# Patient Record
Sex: FEMALE | Race: WHITE | NOT HISPANIC OR LATINO | Employment: STUDENT | URBAN - METROPOLITAN AREA
[De-identification: names, ages, dates, MRNs, and addresses within clinical notes are randomized per-mention and may not be internally consistent; named-entity substitution may affect disease eponyms.]

---
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
## 2021-01-04 ENCOUNTER — TELEPHONE (OUTPATIENT)
Dept: OBGYN CLINIC | Facility: CLINIC | Age: 21
End: 2021-01-04

## 2021-01-04 DIAGNOSIS — Z30.41 ENCOUNTER FOR SURVEILLANCE OF CONTRACEPTIVE PILLS: Primary | ICD-10-CM

## 2021-01-04 DIAGNOSIS — Z30.011 ENCOUNTER FOR INITIAL PRESCRIPTION OF CONTRACEPTIVE PILLS: ICD-10-CM

## 2021-01-04 RX ORDER — NORGESTIMATE AND ETHINYL ESTRADIOL 7DAYSX3 LO
1 KIT ORAL DAILY
Qty: 84 TABLET | Refills: 2 | Status: SHIPPED | OUTPATIENT
Start: 2021-01-04 | End: 2021-01-07 | Stop reason: SDUPTHER

## 2021-01-04 NOTE — TELEPHONE ENCOUNTER
Pt states Jayna Jackman is substituting Tri Lo Sarah for norgestimate/ethinyl estradiol triphasic (on back order)    Please sign off on presc to [facility]

## 2021-01-06 ENCOUNTER — TELEPHONE (OUTPATIENT)
Dept: OBGYN CLINIC | Facility: CLINIC | Age: 21
End: 2021-01-06

## 2021-01-07 DIAGNOSIS — Z30.41 ENCOUNTER FOR SURVEILLANCE OF CONTRACEPTIVE PILLS: ICD-10-CM

## 2021-01-07 RX ORDER — NORGESTIMATE AND ETHINYL ESTRADIOL 7DAYSX3 LO
1 KIT ORAL DAILY
Qty: 84 TABLET | Refills: 2 | Status: SHIPPED | OUTPATIENT
Start: 2021-01-07 | End: 2021-08-30 | Stop reason: SDUPTHER

## 2021-01-07 NOTE — TELEPHONE ENCOUNTER
[facility] pharm needs ocp presc to say ok to substitue with Tri Lo Swathi - please sign off on presc to EDITH Gautam

## 2021-04-02 ENCOUNTER — TELEPHONE (OUTPATIENT)
Dept: OBGYN CLINIC | Facility: CLINIC | Age: 21
End: 2021-04-02

## 2021-04-02 NOTE — TELEPHONE ENCOUNTER
Pt calling to double check rx  rx is otc lo, but rx'd by bob robb  Lm for pt that we have never seen her and she possibly called the wrong office

## 2021-06-14 ENCOUNTER — TELEPHONE (OUTPATIENT)
Dept: OBGYN CLINIC | Facility: CLINIC | Age: 21
End: 2021-06-14

## 2021-06-14 NOTE — TELEPHONE ENCOUNTER
Patient is calling with questions regarding spotting and if there could be correlation to the covid vaccine  She did miss pills this cycle

## 2021-06-14 NOTE — TELEPHONE ENCOUNTER
pt had delayed ocp x 1 in 2nd wk active pill on 6/1/2021 & made up 18 hrs later  Also had 1st COVID vaccine on 6/3/2021  Did not have intercourse at time of delayed pill per pt   recom back up birth control for rest of this cycle if sexually active & consistently if misses or delays pills  Will recall if persistent BTB

## 2021-08-30 DIAGNOSIS — Z30.41 ENCOUNTER FOR SURVEILLANCE OF CONTRACEPTIVE PILLS: ICD-10-CM

## 2021-08-30 RX ORDER — NORGESTIMATE AND ETHINYL ESTRADIOL 7DAYSX3 LO
1 KIT ORAL DAILY
Qty: 84 TABLET | Refills: 0 | Status: SHIPPED | OUTPATIENT
Start: 2021-08-30 | End: 2021-11-16 | Stop reason: SDUPTHER

## 2021-08-30 NOTE — TELEPHONE ENCOUNTER
Pt req rf Ortho TriCyclen Lo - seen 6/2020 as new pt & had ocp f/u appt 8/2021 - advised pt needs to schedule yearly appt - sched for 11/16/2021    Please sign off on presc to hold until appt to [facility])

## 2021-11-16 ENCOUNTER — ANNUAL EXAM (OUTPATIENT)
Dept: OBGYN CLINIC | Facility: CLINIC | Age: 21
End: 2021-11-16
Payer: COMMERCIAL

## 2021-11-16 VITALS
SYSTOLIC BLOOD PRESSURE: 108 MMHG | WEIGHT: 110 LBS | HEIGHT: 58 IN | DIASTOLIC BLOOD PRESSURE: 66 MMHG | BODY MASS INDEX: 23.09 KG/M2

## 2021-11-16 DIAGNOSIS — Z30.41 ENCOUNTER FOR SURVEILLANCE OF CONTRACEPTIVE PILLS: ICD-10-CM

## 2021-11-16 DIAGNOSIS — Z11.3 SCREENING EXAMINATION FOR STD (SEXUALLY TRANSMITTED DISEASE): ICD-10-CM

## 2021-11-16 DIAGNOSIS — Z01.419 ENCOUNTER FOR ANNUAL ROUTINE GYNECOLOGICAL EXAMINATION: Primary | ICD-10-CM

## 2021-11-16 PROCEDURE — 99395 PREV VISIT EST AGE 18-39: CPT | Performed by: OBSTETRICS & GYNECOLOGY

## 2021-11-16 RX ORDER — NORGESTIMATE AND ETHINYL ESTRADIOL 7DAYSX3 LO
1 KIT ORAL DAILY
Qty: 84 TABLET | Refills: 3 | Status: SHIPPED | OUTPATIENT
Start: 2021-11-16

## 2021-11-18 LAB
C TRACH RRNA SPEC QL NAA+PROBE: NOT DETECTED
N GONORRHOEA RRNA SPEC QL NAA+PROBE: NOT DETECTED
T VAGINALIS RRNA SPEC QL NAA+PROBE: NOT DETECTED

## 2021-11-23 LAB
CLINICAL INFO: NORMAL
CYTO CVX: NORMAL
CYTOLOGY CMNT CVX/VAG CYTO-IMP: NORMAL
DATE PREVIOUS BX: NORMAL
LMP START DATE: NORMAL
SL AMB PREV. PAP:: NORMAL
SPECIMEN SOURCE CVX/VAG CYTO: NORMAL

## 2022-11-28 ENCOUNTER — ANNUAL EXAM (OUTPATIENT)
Dept: OBGYN CLINIC | Facility: CLINIC | Age: 22
End: 2022-11-28

## 2022-11-28 VITALS
WEIGHT: 121 LBS | HEIGHT: 58 IN | DIASTOLIC BLOOD PRESSURE: 68 MMHG | SYSTOLIC BLOOD PRESSURE: 104 MMHG | BODY MASS INDEX: 25.4 KG/M2

## 2022-11-28 DIAGNOSIS — Z01.419 ENCOUNTER FOR ANNUAL ROUTINE GYNECOLOGICAL EXAMINATION: Primary | ICD-10-CM

## 2022-11-28 DIAGNOSIS — Z30.41 ENCOUNTER FOR SURVEILLANCE OF CONTRACEPTIVE PILLS: ICD-10-CM

## 2022-11-28 DIAGNOSIS — Z11.3 SCREENING EXAMINATION FOR STD (SEXUALLY TRANSMITTED DISEASE): ICD-10-CM

## 2022-11-28 RX ORDER — NORGESTIMATE AND ETHINYL ESTRADIOL 7DAYSX3 LO
1 KIT ORAL DAILY
Qty: 84 TABLET | Refills: 4 | Status: SHIPPED | OUTPATIENT
Start: 2022-11-28

## 2022-11-28 NOTE — PROGRESS NOTES
Assessment/Plan:  Pap smear done as well as annual   Cultures obtained for GC, chlamydia, Trichomonas  Encouraged self-breast examination as well as calcium supplementation  Continue Ortho-Tri-Cyclen Lo x1 year  Reviewed safe sex practices  Return to office in 1 year or p r n  No problem-specific Assessment & Plan notes found for this encounter  Diagnoses and all orders for this visit:    Encounter for annual routine gynecological examination  -     Liquid-based pap, screening    Encounter for surveillance of contraceptive pills  -     norgestimate-ethinyl estradiol (Tri-Lo-Sarah) 0 18/0 215/0 25 MG-25 MCG per tablet; Take 1 tablet by mouth daily    Screening examination for STD (sexually transmitted disease)  -     Ct, Ng, Trich vag by TANVI          Subjective:      Patient ID: Meet Hewitt is a 25 y o  female  HPI     This is a very pleasant 20-year-old female G0 presents for gyn exam   Her menstrual cycles are regular every 4 weeks lasting 4 days with no breakthrough bleeding  She denies any changes in bowel or bladder function  She has had 2 sexual partners in the course of the year  She uses condoms intermittently  She did receive the Gardasil vaccine age 14  She is in her senior year of college  The following portions of the patient's history were reviewed and updated as appropriate: allergies, current medications, past family history, past medical history, past social history, past surgical history and problem list     Review of Systems   Constitutional: Negative for fatigue, fever and unexpected weight change  Respiratory: Negative for cough, chest tightness, shortness of breath and wheezing  Cardiovascular: Negative  Negative for chest pain and palpitations  Gastrointestinal: Negative  Negative for abdominal distention, abdominal pain, blood in stool, constipation, diarrhea, nausea and vomiting  Genitourinary: Negative    Negative for difficulty urinating, dyspareunia, dysuria, flank pain, frequency, genital sores, hematuria, pelvic pain, urgency, vaginal bleeding, vaginal discharge and vaginal pain  Skin: Negative for rash  Objective:      /68   Ht 4' 10" (1 473 m)   Wt 54 9 kg (121 lb)   LMP 11/17/2022   BMI 25 29 kg/m²          Physical Exam  Constitutional:       Appearance: Normal appearance  She is well-developed and well-nourished  Cardiovascular:      Rate and Rhythm: Normal rate and regular rhythm  Pulmonary:      Effort: Pulmonary effort is normal       Breath sounds: Normal breath sounds  Chest:   Breasts:     Right: No inverted nipple, mass, nipple discharge, skin change or tenderness  Left: No inverted nipple, mass, nipple discharge, skin change or tenderness  Abdominal:      General: Bowel sounds are normal  There is no distension  Palpations: Abdomen is soft  Tenderness: There is no abdominal tenderness  There is no guarding or rebound  Genitourinary:     Labia:         Right: No rash, tenderness or lesion  Left: No rash, tenderness or lesion  Vagina: Normal  No signs of injury  No vaginal discharge or tenderness  Cervix: No cervical motion tenderness, discharge, friability, lesion, erythema or cervical bleeding  Uterus: Normal  Not enlarged and not tender  Adnexa:         Right: No mass, tenderness or fullness  Left: No mass, tenderness or fullness  Neurological:      Mental Status: She is alert and oriented to person, place, and time     Psychiatric:         Behavior: Behavior normal

## 2022-12-05 ENCOUNTER — TELEPHONE (OUTPATIENT)
Dept: OBGYN CLINIC | Facility: CLINIC | Age: 22
End: 2022-12-05

## 2022-12-05 LAB
LAB AP GYN PRIMARY INTERPRETATION: ABNORMAL
LAB AP LMP: ABNORMAL
Lab: ABNORMAL
PATH INTERP SPEC-IMP: ABNORMAL

## 2022-12-05 NOTE — TELEPHONE ENCOUNTER
----- Message from Lolly Britt DO sent at 12/5/2022 10:02 AM EST -----  Please call the patient regarding her abnormal result  Informed Pap smear mildly abnormal, recommend colposcopy  She will need NSAIDs prior to office visit, hydrate

## 2022-12-05 NOTE — TELEPHONE ENCOUNTER
Pt informed of pap results & recom f/u with colposcopy - procedure explained with pre-instr given  Info sheet mailed to pt  appt scheduled

## 2023-01-11 ENCOUNTER — PROCEDURE VISIT (OUTPATIENT)
Dept: OBGYN CLINIC | Facility: CLINIC | Age: 23
End: 2023-01-11

## 2023-01-11 VITALS
BODY MASS INDEX: 22.25 KG/M2 | DIASTOLIC BLOOD PRESSURE: 70 MMHG | SYSTOLIC BLOOD PRESSURE: 106 MMHG | HEIGHT: 58 IN | WEIGHT: 106 LBS

## 2023-01-11 DIAGNOSIS — R87.612 LOW GRADE SQUAMOUS INTRAEPITHELIAL LESION ON CYTOLOGIC SMEAR OF CERVIX (LGSIL): Primary | ICD-10-CM

## 2023-01-11 NOTE — PROGRESS NOTES
Assessment/Plan:  Colposcopy done with ECC and biopsy at 7  Well-tolerated  Patient received Gardasil vaccine age 14  She will call for results in 2 weeks  Provided stable, return to office 11/2023 for annual visit and repeat Pap smear  All questions answered  No problem-specific Assessment & Plan notes found for this encounter  Diagnoses and all orders for this visit:    Low grade squamous intraepithelial lesion on cytologic smear of cervix (LGSIL)    Other orders  -     Colposcopy          Subjective:      Patient ID: Maggi Foote is a 25 y o  female  HPI     This is a pleasant 25-year-old female [de-identified] presents for colposcopy  She had her first abnormal Pap smear revealing low-grade CATHY  LGSIL    Gardasil age 14        The following portions of the patient's history were reviewed and updated as appropriate: allergies, current medications, past family history, past medical history, past social history, past surgical history and problem list     Review of Systems   Constitutional: Negative for fatigue, fever and unexpected weight change  Respiratory: Negative for cough, chest tightness, shortness of breath and wheezing  Cardiovascular: Negative  Negative for chest pain and palpitations  Gastrointestinal: Negative  Negative for abdominal distention, abdominal pain, blood in stool, constipation, diarrhea, nausea and vomiting  Genitourinary: Negative  Negative for difficulty urinating, dyspareunia, dysuria, flank pain, frequency, genital sores, hematuria, pelvic pain, urgency, vaginal bleeding, vaginal discharge and vaginal pain  Skin: Negative for rash  Objective:      /70   Ht 4' 10" (1 473 m)   Wt 48 1 kg (106 lb)   LMP 12/10/2022   BMI 22 15 kg/m²          Physical Exam  Constitutional:       Appearance: Normal appearance  She is well-developed     Pulmonary:      Effort: Pulmonary effort is normal    Genitourinary:     Labia:         Right: No rash, tenderness or lesion  Left: No rash, tenderness or lesion  Vagina: Normal  No signs of injury  No vaginal discharge or tenderness  Cervix: No cervical motion tenderness, discharge, friability, lesion, erythema or cervical bleeding  Adnexa:         Right: No mass, tenderness or fullness  Left: No mass, tenderness or fullness  Neurological:      Mental Status: She is alert and oriented to person, place, and time  Psychiatric:         Behavior: Behavior normal             Colposcopy     Date/Time 1/11/2023 1:16 PM     Universal Protocol   Consent: Verbal consent obtained  Consent given by: patient  Patient understanding: patient states understanding of the procedure being performed  Patient identity confirmed: verbally with patient       Performed by  Clive Crawley DO     Authorized by Clive Crawley DO        Pre-procedure details     Premeds:  Ibuprofen    Prepped with: acetic acid       Indication    LSIL     Procedure Details   Procedure: Colposcopy w/ cervical biopsy and ECC      Under satisfactory analgesia the patient was prepped and draped in the dorsal lithotomy position: yes      [place] speculum was placed in the vagina: yes      Endocervix was curetted using a Kevorkian curette: yes      Cervical biopsy performed with a cervical biopsy punch: yes      Monsel's solution was applied: yes      Biopsy(s): yes      Location:  ECC, 7 o'clock    Specimen to pathology: yes       Post-procedure      Findings: White epithelium      Patient tolerance of procedure: Tolerated well, no immediate complications     Comments       Cervix was visualized cleansed with acetic acid  The squamocolumnar junction was visualized  Mild acetowhitening changes noted at 7:00  ECC was performed first followed by biopsy at 7  Hemostasis was maintained using Monsel solution  Patient tolerated procedure well

## 2023-01-20 ENCOUNTER — TELEPHONE (OUTPATIENT)
Dept: OBGYN CLINIC | Facility: CLINIC | Age: 23
End: 2023-01-20

## 2023-01-20 NOTE — TELEPHONE ENCOUNTER
Pt viewing results in [address] [place] [address],[address] [facility] pathology from 1/11/2023  Informed will f/u with KA on 1//23/2022 as results not reviewed by provider yet

## 2023-01-23 NOTE — TELEPHONE ENCOUNTER
Pt unable to schedule appt for 1/24/2022 due to school class shedule  Scheduled appt for 1/25/2022 to discuss f/u colposcopy results

## 2023-01-24 NOTE — TELEPHONE ENCOUNTER
Pt had sched appt for 1/25/2022 f/u colposcopy results & is now requesting if can discuss via phone or virtual as she is away @ school in [place]

## 2023-01-25 ENCOUNTER — TELEMEDICINE (OUTPATIENT)
Dept: OBGYN CLINIC | Facility: CLINIC | Age: 23
End: 2023-01-25

## 2023-01-25 DIAGNOSIS — N87.9 CERVICAL DYSPLASIA: Primary | ICD-10-CM

## 2023-01-25 NOTE — PROGRESS NOTES
Virtual Regular Visit    Verification of patient location:    Patient is located in the following state in which I hold an active license [place]      Assessment/Plan:    Reviewed pathology consistent with RONEN-2/3 negative ECC  Implications of moderate to severe cervical dysplasia reviewed  Discussed treatment options including excisional procedure, LEEP  Risks and benefits reviewed  Patient is appropriately upset  She states that her paternal aunt had some type of GYN cancer resulting in adjuvant therapy  She will keep me updated with her family history  At this point she is in her senior year of college will be graduating 5/2023  I will have office staff contact her regarding scheduling surgical date  She will need preop visit  We discussed alternative plans including follow-up colposcopy 6 months  She will discuss this with her family  All questions answered at this time  Problem List Items Addressed This Visit    None           Reason for visit is   Chief Complaint   Patient presents with   • Virtual Regular Visit        Encounter provider Josue Field DO    Provider located at [address] [place] [address]  [phone]      Recent Visits  Date Type Provider Dept   01/20/23 Telephone Kary Henao RN [facility]   Showing recent visits within past 7 days and meeting all other requirements  Future Appointments  No visits were found meeting these conditions  Showing future appointments within next 150 days and meeting all other requirements       The patient was identified by name and date of birth  Frank Beard was informed that this is a telemedicine visit and that the visit is being conducted through the [employer]  She agrees to proceed     My office door was closed  No one else was in the room  She acknowledged consent and understanding of privacy and security of the video platform   The patient has agreed to participate and understands they can discontinue the visit at any time  Patient is aware this is a billable service  Vivian Sahni is a 25 y o  female G0 presents via video conference for follow-up colposcopy  She had her first Pap smear which had revealed low-grade CATHY  She underwent colposcopy which had revealed RONEN-2/3 with negative ECC  She received the Gardasil vaccine at age 14  She has had 2 sexual partners over the course of the year  She uses condoms intermittently  Her menstrual cycles are regular every 4 weeks lasting 4 days  HPI     Past Medical History:   Diagnosis Date   • Eruption, polymorphous, light     allergist       Past Surgical History:   Procedure Laterality Date   • FOREARM FRACTURE SURGERY Right 10/2018       Current Outpatient Medications   Medication Sig Dispense Refill   • multivitamin (THERAGRAN) TABS Take 1 tablet by mouth daily     • norgestimate-ethinyl estradiol (Tri-Lo-Sarah) 0 18/0 215/0 25 MG-25 MCG per tablet Take 1 tablet by mouth daily 84 tablet 4     No current facility-administered medications for this visit  Allergies   Allergen Reactions   • Amoxicillin Fever       Review of Systems   Constitutional: Negative for fatigue, fever and unexpected weight change  Respiratory: Negative for cough, chest tightness, shortness of breath and wheezing  Cardiovascular: Negative  Negative for chest pain and palpitations  Gastrointestinal: Negative  Negative for abdominal distention, abdominal pain, blood in stool, constipation, diarrhea, nausea and vomiting  Genitourinary: Negative  Negative for difficulty urinating, dyspareunia, dysuria, flank pain, frequency, genital sores, hematuria, pelvic pain, urgency, vaginal bleeding, vaginal discharge and vaginal pain  Skin: Negative for rash  Video Exam    There were no vitals filed for this visit  Physical Exam  Constitutional:       Appearance: Normal appearance     Pulmonary: Effort: Pulmonary effort is normal    Neurological:      Mental Status: She is alert and oriented to person, place, and time     Psychiatric:         Behavior: Behavior normal           I spent 25 minutes directly with the patient during this visit

## 2023-01-30 ENCOUNTER — TELEPHONE (OUTPATIENT)
Dept: OBGYN CLINIC | Facility: CLINIC | Age: 23
End: 2023-01-30

## 2023-01-30 ENCOUNTER — PREP FOR PROCEDURE (OUTPATIENT)
Dept: OBGYN CLINIC | Facility: CLINIC | Age: 23
End: 2023-01-30

## 2023-01-30 DIAGNOSIS — Z01.818 PREOPERATIVE TESTING: Primary | ICD-10-CM

## 2023-01-30 DIAGNOSIS — D06.9 CERVICAL INTRAEPITHELIAL NEOPLASIA GRADE III WITH SEVERE DYSPLASIA: ICD-10-CM

## 2023-01-30 DIAGNOSIS — R87.613 PAP SMEAR ABNORMALITY OF CERVIX WITH HGSIL: ICD-10-CM

## 2023-01-30 NOTE — TELEPHONE ENCOUNTER
Patient aware surgery scheduled 3-27-23 at [facility]  Pre-op apt with Dr Annie Berger scheduled and aware will need PAT labs

## 2023-03-16 NOTE — PRE-PROCEDURE INSTRUCTIONS
Pre-Surgery Instructions:   Medication Instructions   • multivitamin (THERAGRAN) TABS Hold this medication for 7 days before surgery     • norgestimate-ethinyl estradiol (Tri-Lo-Sarah) 0 18/0 215/0 25 MG-25 MCG per tablet Hold day of surgery      Medication instructions for day surgery reviewed  Please use only a sip of water to take your instructed medications  Avoid all over the counter vitamins, supplements and NSAIDS for one week prior to surgery per anesthesia guidelines  Tylenol is ok to take as needed  You will receive a call one business day prior to surgery with an arrival time and hospital directions  If your surgery is scheduled on a Monday, the hospital will be calling you on the Friday prior to your surgery  If you have not heard from anyone by 8pm, please call the hospital supervisor through the hospital  at [phone]  Sherman Agee [phone])  Do not eat or drink anything after midnight the night before your surgery, including candy, mints, lifesavers, or chewing gum  Do not drink alcohol 24hrs before your surgery  Try not to smoke at least 24hrs before your surgery  Follow the pre surgery showering instructions as listed in the [facility] Surgical Experience Booklet” or otherwise provided by your surgeon's office  Do not shave the surgical area 24 hours before surgery  Do not apply any lotions, creams, including makeup, cologne, deodorant, or perfumes after showering on the day of your surgery  No contact lenses, eye make-up, or artificial eyelashes  Remove nail polish, including gel polish, and any artificial, gel, or acrylic nails if possible  Remove all jewelry including rings and body piercing jewelry  Wear causal clothing that is easy to take on and off  Consider your type of surgery  Keep any valuables, jewelry, piercings at home  Please bring any specially ordered equipment (sling, braces) if indicated      Arrange for a responsible person to drive you to and from the hospital on the day of your surgery  Visitor Guidelines discussed  Call the surgeon's office with any new illnesses, exposures, or additional questions prior to surgery  Please reference your [facility] Surgical Experience Booklet” for additional information to prepare for your upcoming surgery

## 2023-03-20 ENCOUNTER — PREP FOR PROCEDURE (OUTPATIENT)
Dept: OBGYN CLINIC | Facility: CLINIC | Age: 23
End: 2023-03-20

## 2023-03-20 ENCOUNTER — OFFICE VISIT (OUTPATIENT)
Dept: OBGYN CLINIC | Facility: CLINIC | Age: 23
End: 2023-03-20

## 2023-03-20 ENCOUNTER — APPOINTMENT (OUTPATIENT)
Dept: LAB | Facility: CLINIC | Age: 23
End: 2023-03-20

## 2023-03-20 VITALS
WEIGHT: 107 LBS | DIASTOLIC BLOOD PRESSURE: 70 MMHG | BODY MASS INDEX: 22.46 KG/M2 | HEIGHT: 58 IN | SYSTOLIC BLOOD PRESSURE: 110 MMHG

## 2023-03-20 DIAGNOSIS — Z01.818 PREOP TESTING: ICD-10-CM

## 2023-03-20 DIAGNOSIS — Z01.818 PREOP TESTING: Primary | ICD-10-CM

## 2023-03-20 DIAGNOSIS — D06.9 CERVICAL INTRAEPITHELIAL NEOPLASIA GRADE III WITH SEVERE DYSPLASIA: Primary | ICD-10-CM

## 2023-03-20 LAB
ANION GAP SERPL CALCULATED.3IONS-SCNC: 2 MMOL/L (ref 4–13)
B-HCG SERPL-ACNC: <2 MIU/ML
BASOPHILS # BLD AUTO: 0.04 THOUSANDS/ÂΜL (ref 0–0.1)
BASOPHILS NFR BLD AUTO: 1 % (ref 0–1)
BUN SERPL-MCNC: 12 MG/DL (ref 5–25)
CALCIUM SERPL-MCNC: 8.8 MG/DL (ref 8.3–10.1)
CHLORIDE SERPL-SCNC: 110 MMOL/L (ref 96–108)
CO2 SERPL-SCNC: 27 MMOL/L (ref 21–32)
CREAT SERPL-MCNC: 0.71 MG/DL (ref 0.6–1.3)
EOSINOPHIL # BLD AUTO: 0.3 THOUSAND/ÂΜL (ref 0–0.61)
EOSINOPHIL NFR BLD AUTO: 5 % (ref 0–6)
ERYTHROCYTE [DISTWIDTH] IN BLOOD BY AUTOMATED COUNT: 13.3 % (ref 11.6–15.1)
GFR SERPL CREATININE-BSD FRML MDRD: 122 ML/MIN/1.73SQ M
GLUCOSE P FAST SERPL-MCNC: 89 MG/DL (ref 65–99)
HCT VFR BLD AUTO: 41.7 % (ref 34.8–46.1)
HGB BLD-MCNC: 13.4 G/DL (ref 11.5–15.4)
IMM GRANULOCYTES # BLD AUTO: 0.01 THOUSAND/UL (ref 0–0.2)
IMM GRANULOCYTES NFR BLD AUTO: 0 % (ref 0–2)
LYMPHOCYTES # BLD AUTO: 1.99 THOUSANDS/ÂΜL (ref 0.6–4.47)
LYMPHOCYTES NFR BLD AUTO: 36 % (ref 14–44)
MCH RBC QN AUTO: 28.6 PG (ref 26.8–34.3)
MCHC RBC AUTO-ENTMCNC: 32.1 G/DL (ref 31.4–37.4)
MCV RBC AUTO: 89 FL (ref 82–98)
MONOCYTES # BLD AUTO: 0.33 THOUSAND/ÂΜL (ref 0.17–1.22)
MONOCYTES NFR BLD AUTO: 6 % (ref 4–12)
NEUTROPHILS # BLD AUTO: 2.87 THOUSANDS/ÂΜL (ref 1.85–7.62)
NEUTS SEG NFR BLD AUTO: 52 % (ref 43–75)
NRBC BLD AUTO-RTO: 0 /100 WBCS
PLATELET # BLD AUTO: 237 THOUSANDS/UL (ref 149–390)
PMV BLD AUTO: 10.4 FL (ref 8.9–12.7)
POTASSIUM SERPL-SCNC: 4.4 MMOL/L (ref 3.5–5.3)
RBC # BLD AUTO: 4.69 MILLION/UL (ref 3.81–5.12)
SODIUM SERPL-SCNC: 139 MMOL/L (ref 135–147)
WBC # BLD AUTO: 5.54 THOUSAND/UL (ref 4.31–10.16)

## 2023-03-20 NOTE — PROGRESS NOTES
Assessment/Plan:  Reviewed pathology consistent with RONEN-2/3  Discussed the risks and benefits of examined anesthesia LEEP procedure including injury to adjacent structures, leaving residual disease requiring further surgery, cervical incompetence in future pregnancies  All questions obtained  Consent obtained today  Recommend preop labs including CBC, BMP, pregnancy test   She will schedule postop visit 2 to 3 weeks  All questions answered  No problem-specific Assessment & Plan notes found for this encounter  Diagnoses and all orders for this visit:    Cervical intraepithelial neoplasia grade III with severe dysplasia          Subjective:      Patient ID: Rafiq Kee is a 25 y o  female  HPI     This is a very pleasant 44-year-old female [de-identified] presents for discussion of cervical dysplasia  She had her first abnormal Pap smear revealing low-grade CATHY  She then underwent colposcopy revealing RONEN-2/3 with negative endocervical cells we have discussed treatment options  Including examined anesthesia, colposcopy  She did receive the Gardasil vaccine age 14  This is her first abnormal Pap smear  The following portions of the patient's history were reviewed and updated as appropriate: allergies, current medications, past family history, past medical history, past social history, past surgical history and problem list     Review of Systems   Constitutional: Negative for fatigue, fever and unexpected weight change  Respiratory: Negative for cough, chest tightness, shortness of breath and wheezing  Cardiovascular: Negative  Negative for chest pain and palpitations  Gastrointestinal: Negative  Negative for abdominal distention, abdominal pain, blood in stool, constipation, diarrhea, nausea and vomiting  Genitourinary: Negative    Negative for difficulty urinating, dyspareunia, dysuria, flank pain, frequency, genital sores, hematuria, pelvic pain, urgency, vaginal bleeding, vaginal discharge and vaginal pain  Skin: Negative for rash  Objective:      /70   Ht 4' 10" (1 473 m)   Wt 48 5 kg (107 lb)   LMP 02/20/2023   BMI 22 36 kg/m²          Physical Exam  Constitutional:       Appearance: Normal appearance  HENT:      Head: Normocephalic and atraumatic  Cardiovascular:      Rate and Rhythm: Normal rate and regular rhythm  Pulmonary:      Effort: Pulmonary effort is normal       Breath sounds: Normal breath sounds  Abdominal:      General: Bowel sounds are normal       Palpations: Abdomen is soft  Genitourinary:     Labia:         Right: No rash, tenderness or lesion  Left: No rash, tenderness or lesion  Vagina: No signs of injury  No vaginal discharge or tenderness  Cervix: No cervical motion tenderness, discharge, friability, lesion, erythema or cervical bleeding  Uterus: Not enlarged and not tender  Adnexa:         Right: No mass, tenderness or fullness  Left: No mass, tenderness or fullness  Skin:     General: Skin is dry  Neurological:      Mental Status: She is alert and oriented to person, place, and time  Psychiatric:         Behavior: Behavior normal        I have spent a total time of 30 minutes on 03/20/23 in caring for this patient including Instructions for management

## 2023-03-24 NOTE — H&P
H&P Exam - Gynecology   Sydnie Rosa 25 y o  female MRN: [id number]  Unit/Bed#:  Encounter: [id number]    Assessment/Plan     Assessment:  #1  RONEN 2/3    Plan:  Reviewed pathology consistent with RONEN-2/3  Discussed the risks and benefits of examined anesthesia LEEP procedure including injury to adjacent structures, leaving residual disease requiring further surgery, cervical incompetence in future pregnancies  All questions obtained  Consent obtained today  Recommend preop labs including CBC, BMP, pregnancy test   She will schedule postop visit 2 to 3 weeks  All questions answered  History of Present Illness     HPI:  Sydnie Rosa is a 25 y o  female [de-identified] presents for EUA, LEEP  [facility] She had her first abnormal Pap smear revealing low-grade CATHY  She then underwent colposcopy revealing RONEN-2/3 with negative endocervical cells we have discussed treatment options  Including examined anesthesia, colposcopy  11/22 Pap LGSIL      1/2023 Colpo:    A  Cervix, Endocervical, ECC:      - Benign fragments of endocervical glands     B  Cervix, 7'Oclock:      - High-grade squamous intraepithelial lesion (HSIL/CIN2-3), see note       - HSIL focally involves endocervical glands       She did receive the Gardasil vaccine age 14  This is her first abnormal Pap smear       Review of Systems   Constitutional: Negative for fatigue, fever and unexpected weight change  Respiratory: Negative for cough, chest tightness, shortness of breath and wheezing  Cardiovascular: Negative  Negative for chest pain and palpitations  Gastrointestinal: Negative  Negative for abdominal distention, abdominal pain, blood in stool, constipation, diarrhea, nausea and vomiting  Genitourinary: Negative  Negative for difficulty urinating, dyspareunia, dysuria, flank pain, frequency, genital sores, hematuria, pelvic pain, urgency, vaginal bleeding, vaginal discharge and vaginal pain  Skin: Negative for rash         Historical "Information   Past Medical History:   Diagnosis Date   • Eruption, polymorphous, light     allergist     Past Surgical History:   Procedure Laterality Date   • FOREARM FRACTURE SURGERY Right 10/2018   • GUM SURGERY       OB/GYN History: as above  Family History   Problem Relation Age of Onset   • Asthma Father    • Heart disease Maternal Grandmother    • Brain cancer Maternal Grandmother    • Heart disease Maternal Grandfather    • Uterine cancer Paternal Aunt      Social History   Social History     Substance and Sexual Activity   Alcohol Use Yes    Comment: occassional     Social History     Substance and Sexual Activity   Drug Use Yes   • Types: Marijuana    Comment: occassional     Social History     Tobacco Use   Smoking Status Never   Smokeless Tobacco Never     E-Cigarette/Vaping   • E-Cigarette Use Former User      E-Cigarette/Vaping Substances   • Nicotine Yes    • THC No    • CBD No    • Flavoring No    • Other No    • Unknown No        Meds/Allergies   all current active meds have been reviewed  Allergies   Allergen Reactions   • Amoxicillin Fever     Pt denies allergy but family HX of it       Objective   Vitals: Height 4' 10\" (1 473 m), weight 48 1 kg (106 lb), not currently breastfeeding  No intake or output data in the 24 hours ending 03/24/23 1505    Invasive Devices: Invasive Devices     None                 Physical Exam  Constitutional:       Appearance: Normal appearance  Cardiovascular:      Rate and Rhythm: Normal rate and regular rhythm  Pulmonary:      Effort: Pulmonary effort is normal       Breath sounds: Normal breath sounds  Abdominal:      General: Bowel sounds are normal  There is no distension  Palpations: Abdomen is soft  Tenderness: There is no abdominal tenderness  There is no guarding or rebound  Genitourinary:     Labia:         Right: No rash, tenderness or lesion  Left: No rash, tenderness or lesion  Vagina: No signs of injury   No vaginal " discharge or tenderness  Cervix: No cervical motion tenderness, discharge, friability, lesion, erythema or cervical bleeding  Uterus: Not enlarged and not tender  Adnexa:         Right: No mass, tenderness or fullness  Left: No mass, tenderness or fullness  Neurological:      Mental Status: She is alert and oriented to person, place, and time  Psychiatric:         Behavior: Behavior normal          Lab Results: I have personally reviewed pertinent reports  Imaging: I have personally reviewed pertinent reports  EKG, Pathology, and Other Studies: I have personally reviewed pertinent reports  Code Status: No Order  Advance Directive and Living Will:      Power of :    POLST:      Counseling / Coordination of Care  Total floor / unit time spent today 30 minutes  Greater than 50% of total time was spent with the patient and / or family counseling and / or coordination of care  A description of the counseling / coordination of care

## 2023-03-26 ENCOUNTER — ANESTHESIA EVENT (OUTPATIENT)
Dept: PERIOP | Facility: HOSPITAL | Age: 23
End: 2023-03-26

## 2023-03-27 ENCOUNTER — ANESTHESIA (OUTPATIENT)
Dept: PERIOP | Facility: HOSPITAL | Age: 23
End: 2023-03-27

## 2023-03-27 ENCOUNTER — HOSPITAL ENCOUNTER (OUTPATIENT)
Facility: HOSPITAL | Age: 23
Setting detail: OUTPATIENT SURGERY
Discharge: HOME | End: 2023-03-27
Attending: OBSTETRICS & GYNECOLOGY | Admitting: OBSTETRICS & GYNECOLOGY

## 2023-03-27 VITALS
WEIGHT: 107 LBS | SYSTOLIC BLOOD PRESSURE: 109 MMHG | OXYGEN SATURATION: 100 % | DIASTOLIC BLOOD PRESSURE: 68 MMHG | TEMPERATURE: 96.7 F | RESPIRATION RATE: 18 BRPM | HEART RATE: 58 BPM | BODY MASS INDEX: 22.46 KG/M2 | HEIGHT: 58 IN

## 2023-03-27 DIAGNOSIS — D06.9 CERVICAL INTRAEPITHELIAL NEOPLASIA GRADE III WITH SEVERE DYSPLASIA: ICD-10-CM

## 2023-03-27 LAB
EXT PREGNANCY TEST URINE: NEGATIVE
EXT. CONTROL: NORMAL

## 2023-03-27 RX ORDER — ONDANSETRON 2 MG/ML
4 INJECTION INTRAMUSCULAR; INTRAVENOUS ONCE AS NEEDED
Status: DISCONTINUED | OUTPATIENT
Start: 2023-03-27 | End: 2023-03-27 | Stop reason: HOSPADM

## 2023-03-27 RX ORDER — MIDAZOLAM HYDROCHLORIDE 2 MG/2ML
INJECTION, SOLUTION INTRAMUSCULAR; INTRAVENOUS AS NEEDED
Status: DISCONTINUED | OUTPATIENT
Start: 2023-03-27 | End: 2023-03-27

## 2023-03-27 RX ORDER — FENTANYL CITRATE 50 UG/ML
INJECTION, SOLUTION INTRAMUSCULAR; INTRAVENOUS AS NEEDED
Status: DISCONTINUED | OUTPATIENT
Start: 2023-03-27 | End: 2023-03-27

## 2023-03-27 RX ORDER — SODIUM CHLORIDE, SODIUM LACTATE, POTASSIUM CHLORIDE, CALCIUM CHLORIDE 600; 310; 30; 20 MG/100ML; MG/100ML; MG/100ML; MG/100ML
INJECTION, SOLUTION INTRAVENOUS CONTINUOUS PRN
Status: DISCONTINUED | OUTPATIENT
Start: 2023-03-27 | End: 2023-03-27

## 2023-03-27 RX ORDER — KETOROLAC TROMETHAMINE 30 MG/ML
INJECTION, SOLUTION INTRAMUSCULAR; INTRAVENOUS AS NEEDED
Status: DISCONTINUED | OUTPATIENT
Start: 2023-03-27 | End: 2023-03-27

## 2023-03-27 RX ORDER — DEXAMETHASONE SODIUM PHOSPHATE 10 MG/ML
INJECTION, SOLUTION INTRAMUSCULAR; INTRAVENOUS AS NEEDED
Status: DISCONTINUED | OUTPATIENT
Start: 2023-03-27 | End: 2023-03-27

## 2023-03-27 RX ORDER — ONDANSETRON 2 MG/ML
4 INJECTION INTRAMUSCULAR; INTRAVENOUS EVERY 6 HOURS PRN
Status: DISCONTINUED | OUTPATIENT
Start: 2023-03-27 | End: 2023-03-27 | Stop reason: HOSPADM

## 2023-03-27 RX ORDER — ACETIC ACID 5 %
LIQUID (ML) MISCELLANEOUS AS NEEDED
Status: DISCONTINUED | OUTPATIENT
Start: 2023-03-27 | End: 2023-03-27 | Stop reason: HOSPADM

## 2023-03-27 RX ORDER — FENTANYL CITRATE/PF 50 MCG/ML
25 SYRINGE (ML) INJECTION
Status: DISCONTINUED | OUTPATIENT
Start: 2023-03-27 | End: 2023-03-27 | Stop reason: HOSPADM

## 2023-03-27 RX ORDER — IBUPROFEN 400 MG/1
600 TABLET ORAL EVERY 6 HOURS PRN
Status: DISCONTINUED | OUTPATIENT
Start: 2023-03-27 | End: 2023-03-27 | Stop reason: HOSPADM

## 2023-03-27 RX ORDER — OXYCODONE HYDROCHLORIDE 5 MG/1
5 TABLET ORAL EVERY 4 HOURS PRN
Status: DISCONTINUED | OUTPATIENT
Start: 2023-03-27 | End: 2023-03-27 | Stop reason: HOSPADM

## 2023-03-27 RX ORDER — MAGNESIUM HYDROXIDE 1200 MG/15ML
LIQUID ORAL AS NEEDED
Status: DISCONTINUED | OUTPATIENT
Start: 2023-03-27 | End: 2023-03-27 | Stop reason: HOSPADM

## 2023-03-27 RX ORDER — ALBUTEROL SULFATE 2.5 MG/3ML
2.5 SOLUTION RESPIRATORY (INHALATION) ONCE AS NEEDED
Status: DISCONTINUED | OUTPATIENT
Start: 2023-03-27 | End: 2023-03-27 | Stop reason: HOSPADM

## 2023-03-27 RX ORDER — METOCLOPRAMIDE HYDROCHLORIDE 5 MG/ML
10 INJECTION INTRAMUSCULAR; INTRAVENOUS ONCE AS NEEDED
Status: DISCONTINUED | OUTPATIENT
Start: 2023-03-27 | End: 2023-03-27 | Stop reason: HOSPADM

## 2023-03-27 RX ORDER — IODINE SOLUTION STRONG 5% (LUGOL'S) 5 %
SOLUTION ORAL AS NEEDED
Status: DISCONTINUED | OUTPATIENT
Start: 2023-03-27 | End: 2023-03-27 | Stop reason: HOSPADM

## 2023-03-27 RX ORDER — HYDROMORPHONE HCL IN WATER/PF 6 MG/30 ML
0.2 PATIENT CONTROLLED ANALGESIA SYRINGE INTRAVENOUS
Status: DISCONTINUED | OUTPATIENT
Start: 2023-03-27 | End: 2023-03-27 | Stop reason: HOSPADM

## 2023-03-27 RX ORDER — ONDANSETRON 2 MG/ML
INJECTION INTRAMUSCULAR; INTRAVENOUS AS NEEDED
Status: DISCONTINUED | OUTPATIENT
Start: 2023-03-27 | End: 2023-03-27

## 2023-03-27 RX ORDER — PROPOFOL 10 MG/ML
INJECTION, EMULSION INTRAVENOUS AS NEEDED
Status: DISCONTINUED | OUTPATIENT
Start: 2023-03-27 | End: 2023-03-27

## 2023-03-27 RX ORDER — PROMETHAZINE HYDROCHLORIDE 25 MG/ML
6.25 INJECTION, SOLUTION INTRAMUSCULAR; INTRAVENOUS ONCE AS NEEDED
Status: DISCONTINUED | OUTPATIENT
Start: 2023-03-27 | End: 2023-03-27 | Stop reason: HOSPADM

## 2023-03-27 RX ADMIN — PROPOFOL 200 MG: 10 INJECTION, EMULSION INTRAVENOUS at 08:00

## 2023-03-27 RX ADMIN — FENTANYL CITRATE 50 MCG: 50 INJECTION INTRAMUSCULAR; INTRAVENOUS at 08:00

## 2023-03-27 RX ADMIN — KETOROLAC TROMETHAMINE 15 MG: 30 INJECTION, SOLUTION INTRAMUSCULAR at 08:27

## 2023-03-27 RX ADMIN — MIDAZOLAM 2 MG: 1 INJECTION INTRAMUSCULAR; INTRAVENOUS at 07:54

## 2023-03-27 RX ADMIN — DEXAMETHASONE SODIUM PHOSPHATE 10 MG: 10 INJECTION INTRAMUSCULAR; INTRAVENOUS at 08:03

## 2023-03-27 RX ADMIN — ONDANSETRON 4 MG: 2 INJECTION INTRAMUSCULAR; INTRAVENOUS at 08:21

## 2023-03-27 RX ADMIN — SODIUM CHLORIDE, SODIUM LACTATE, POTASSIUM CHLORIDE, AND CALCIUM CHLORIDE: .6; .31; .03; .02 INJECTION, SOLUTION INTRAVENOUS at 07:50

## 2023-03-27 NOTE — OP NOTE
OPERATIVE REPORT  PATIENT NAME: Ely Lopez    :  2000  MRN: [id number]  Pt Location: [facility] OR ROOM 03    SURGERY DATE: 3/27/2023    Surgeon(s) and Role:     Omaira Lopezter, DO - Primary    Preop Diagnosis:  Cervical intraepithelial neoplasia grade III with severe dysplasia [D06 9]    Post-Op Diagnosis Codes:     * Cervical intraepithelial neoplasia grade III with severe dysplasia [D06 9]    Procedure(s):  BIOPSY LEEP CERVIX W/ EXAM UNDER ANESTHESIA  EXAM UNDER ANESTHESIA (EUA)    Specimen(s):  ID Type Source Tests Collected by Time Destination   1 : suture marks 12 o clock Tissue Cervix TISSUE EXAM Mckenzie Christian, DO 3/27/2023 0815    2 : endocervical currettings Tissue Endocervical TISSUE EXAM Mckenzie Gonzales, DO 3/27/2023 0816    3 : 9 o clock position Tissue Cervix TISSUE EXAM Mckenzie Gonzales, DO 3/27/2023 0825        Estimated Blood Loss:   10 mL    Drains:  * No LDAs found *    Anesthesia Type:   General    Operative Indications:  Cervical intraepithelial neoplasia grade III with severe dysplasia [D06 9]      Operative Findings:  Small nulliparous cervix, no pelvic floor prolapse  Minimal uptake of the squamocolumnar junction after applying Lugol's solution  Complications:   None    Procedure and Technique:    Patient was transferred to operating room  Patient was identified by surgeon anesthesiologist   After adequate anesthesia, she was placed in the dorsolithotomy position  The vulva and vagina were then prepped with Hibiclens  Timeout was taken according to hospital protocol  Next an insulated speculum was then placed into the vagina  Cervix was then visualized  Anterior lip of the cervix was grasped using an insulated tenaculum  The cervix was then cleansed with acetic acid followed by Lugol's solution  There was minimal uptake noted along the entire squamocolumnar junction  Next using a medium loop, LEEP procedure was then carried out at 70/40 settings    Specimen was then tagged at 12:00  Biopsy was obtained then at 9:00  Followed by ECC  There was minimal bleeding noted  Of note she is on day 2 of her menstrual cycle  Lugol's solution was then applied at the base  All instruments were then removed from the vagina  Sponge lap and needle counts were correct x2  Patient was then extubated and transferred to recovery room in stable condition           I was present for the entire procedure    Patient Disposition:  PACU         SIGNATURE: Ben Ugarte DO  DATE: March 27, 2023  TIME: 8:40 AM

## 2023-03-27 NOTE — ANESTHESIA PREPROCEDURE EVALUATION
Procedure:  BIOPSY LEEP CERVIX W/ EXAM UNDER ANESTHESIA (Cervix)  EXAM UNDER ANESTHESIA (EUA) (Vagina )    Relevant Problems   No relevant active problems      Component Ref Range & Units 3/20/23 0956    Sodium 135 - 147 mmol/L 139    Potassium 3 5 - 5 3 mmol/L 4 4    Chloride 96 - 108 mmol/L 110 High     CO2 21 - 32 mmol/L 27    ANION GAP 4 - 13 mmol/L 2 Low     BUN 5 - 25 mg/dL 12    Creatinine 0 60 - 1 30 mg/dL 0 71    Comment: Standardized to IDMS reference method   Glucose, Fasting 65 - 99 mg/dL 89    Comment: Specimen collection should occur prior to Sulfasalazine administration due to the potential for falsely depressed results  Specimen collection should occur prior to Sulfapyridine administration due to the potential for falsely elevated results  Calcium 8 3 - 10 1 mg/dL 8 8    eGFR ml/min/1 73sq m 122            Narrative          Component Ref Range & Units 3/20/23 0956    WBC 4 31 - 10 16 Thousand/uL 5 54    RBC 3 81 - 5 12 Million/uL 4 69    Hemoglobin 11 5 - 15 4 g/dL 13 4    Hematocrit 34 8 - 46 1 % 41 7    MCV 82 - 98 fL 89    MCH 26 8 - 34 3 pg 28 6    MCHC 31 4 - 37 4 g/dL 32 1    RDW 11 6 - 15 1 % 13 3    MPV 8 9 - 12 7 fL 10 4    Platelets 588 - 351 Thousands/uL 237          Physical Exam    Airway    Mallampati score: I  TM Distance: >3 FB  Neck ROM: full     Dental       Cardiovascular      Pulmonary      Other Findings        Anesthesia Plan  ASA Score- 1     Anesthesia Type- general with ASA Monitors  Additional Monitors:   Airway Plan:           Plan Factors-    Chart reviewed  Patient summary reviewed  Patient is not a current smoker  Patient did not smoke on day of surgery  Induction- intravenous  Postoperative Plan- Plan for postoperative opioid use  Planned trial extubation    Informed Consent-   I personally reviewed this patient with the CRNA  Discussed and agreed on the Anesthesia Plan with the CRNA  Berdie Landau

## 2023-03-27 NOTE — ANESTHESIA POSTPROCEDURE EVALUATION
Post-Op Assessment Note    CV Status:  Stable    Pain management: adequate     Mental Status:  Alert and awake   Hydration Status:  Euvolemic   PONV Controlled:  Controlled   Airway Patency:  Patent      Post Op Vitals Reviewed: Yes      Staff: CRNA         No notable events documented      /66 (03/27/23 0840)    Temp 98 2 °F (36 8 °C) (03/27/23 0840)    Pulse  80   Resp   16   SpO2 100 % (03/27/23 0841)

## 2023-03-27 NOTE — INTERVAL H&P NOTE
PE: Extremities : present x 4  H&P reviewed  After examining the patient I find no changes in the patients condition since the H&P had been written      Vitals:    03/27/23 0645   BP: 118/80   Pulse: 79   Resp: 18   Temp: (!) 96 9 °F (36 1 °C)   SpO2: 100%

## 2023-11-14 ENCOUNTER — ANNUAL EXAM (OUTPATIENT)
Dept: OBGYN CLINIC | Facility: CLINIC | Age: 23
End: 2023-11-14
Payer: COMMERCIAL

## 2023-11-14 VITALS
HEIGHT: 58 IN | BODY MASS INDEX: 21.83 KG/M2 | WEIGHT: 104 LBS | SYSTOLIC BLOOD PRESSURE: 118 MMHG | DIASTOLIC BLOOD PRESSURE: 68 MMHG

## 2023-11-14 DIAGNOSIS — Z30.41 ENCOUNTER FOR SURVEILLANCE OF CONTRACEPTIVE PILLS: ICD-10-CM

## 2023-11-14 DIAGNOSIS — Z01.419 ENCOUNTER FOR ANNUAL ROUTINE GYNECOLOGICAL EXAMINATION: Primary | ICD-10-CM

## 2023-11-14 DIAGNOSIS — Z98.890 HISTORY OF LOOP ELECTRICAL EXCISION PROCEDURE (LEEP): ICD-10-CM

## 2023-11-14 PROCEDURE — S0612 ANNUAL GYNECOLOGICAL EXAMINA: HCPCS | Performed by: OBSTETRICS & GYNECOLOGY

## 2023-11-14 RX ORDER — NORGESTIMATE AND ETHINYL ESTRADIOL 7DAYSX3 LO
1 KIT ORAL DAILY
Qty: 84 TABLET | Refills: 4 | Status: SHIPPED | OUTPATIENT
Start: 2023-11-14

## 2023-11-14 NOTE — PROGRESS NOTES
Assessment/Plan:  Pap smear done for cytology, reflex HPV. Encourage self breast examination as well as calcium supplementation. Continue Ortho Tri-Cyclen Lo x1 year. Provided above normal return to office in 1 year or as needed  No problem-specific Assessment & Plan notes found for this encounter. Diagnoses and all orders for this visit:    Encounter for annual routine gynecological examination    Encounter for surveillance of contraceptive pills  -     norgestimate-ethinyl estradiol (Tri-Lo-Sarah) 0.18/0.215/0.25 MG-25 MCG per tablet; Take 1 tablet by mouth daily    History of loop electrical excision procedure (LEEP)    Other orders  -     COLLAGEN PO; Take by mouth          Subjective:      Patient ID: Francie Moreno is a 26 y.o. female. HPI    This is a very pleasant 31-year-old female G0 presents for her GYN exam.  She offers no complaints today. Her menstrual cycles are regular every 4 weeks lasting 4 days with no breakthrough bleeding. She denies any changes in bowel or bladder function. She is sexually active and has been in a monogamous relationship for 1 year. Her GYN history significant for LEEP procedure due to colposcopic findings consistent with RONEN-2/3. Pathology on LEEP specimen revealed RONEN-1 and HPV changes, negative ECC. This is her first Pap since LEEP procedure. 3/2023 LEEP CIN1/HPV    1/2023 Colpo= RONEN 2-3    11/2022 Pap Lgsil    Vaccine 10 th grade    Patient graduated college 5/2023. She is now working in [employer], living in [place] and commuting. The following portions of the patient's history were reviewed and updated as appropriate: allergies, current medications, past family history, past medical history, past social history, past surgical history, and problem list.    Review of Systems   Constitutional:  Negative for fatigue, fever and unexpected weight change. Respiratory:  Negative for cough, chest tightness, shortness of breath and wheezing. Cardiovascular: Negative. Negative for chest pain and palpitations. Gastrointestinal: Negative. Negative for abdominal distention, abdominal pain, blood in stool, constipation, diarrhea, nausea and vomiting. Genitourinary: Negative. Negative for difficulty urinating, dyspareunia, dysuria, flank pain, frequency, genital sores, hematuria, pelvic pain, urgency, vaginal bleeding, vaginal discharge and vaginal pain. Skin:  Negative for rash. Objective:      /68   Ht 4' 10" (1.473 m)   Wt 47.2 kg (104 lb)   LMP 11/05/2023 (Exact Date)   BMI 21.74 kg/m²          Physical Exam  Constitutional:       Appearance: Normal appearance. She is well-developed. HENT:      Head: Normocephalic and atraumatic. Cardiovascular:      Rate and Rhythm: Normal rate and regular rhythm. Pulmonary:      Effort: Pulmonary effort is normal.      Breath sounds: Normal breath sounds. Chest:   Breasts:     Right: No inverted nipple, mass, nipple discharge, skin change or tenderness. Left: No inverted nipple, mass, nipple discharge, skin change or tenderness. Abdominal:      General: Bowel sounds are normal. There is no distension. Palpations: Abdomen is soft. Tenderness: There is no abdominal tenderness. There is no guarding or rebound. Genitourinary:     Labia:         Right: No rash, tenderness or lesion. Left: No rash, tenderness or lesion. Vagina: Normal. No signs of injury. No vaginal discharge or tenderness. Cervix: No cervical motion tenderness, discharge, friability, lesion or cervical bleeding. Uterus: Not enlarged and not tender. Adnexa:         Right: No mass, tenderness or fullness. Left: No mass, tenderness or fullness. Neurological:      Mental Status: She is alert.    Psychiatric:         Behavior: Behavior normal.

## 2023-11-27 LAB
LAB AP GYN PRIMARY INTERPRETATION: ABNORMAL
Lab: ABNORMAL
PATH INTERP SPEC-IMP: ABNORMAL

## 2023-11-28 ENCOUNTER — TELEPHONE (OUTPATIENT)
Dept: OBGYN CLINIC | Facility: CLINIC | Age: 23
End: 2023-11-28

## 2023-11-28 NOTE — TELEPHONE ENCOUNTER
----- Message from Marry Bergman DO sent at 11/28/2023  7:46 AM EST -----  Please call the patient regarding her abnormal result.  Inform pap slightly abormal (h/o LEEP), rec repap 6 months

## 2023-11-29 NOTE — TELEPHONE ENCOUNTER
Patient informed of pap results & recommended follow up to have repeat pap in 6 months/KA. Appointment for same scheduled.

## 2024-05-07 ENCOUNTER — OFFICE VISIT (OUTPATIENT)
Dept: OBGYN CLINIC | Facility: CLINIC | Age: 24
End: 2024-05-07
Payer: COMMERCIAL

## 2024-05-07 VITALS
BODY MASS INDEX: 22.88 KG/M2 | DIASTOLIC BLOOD PRESSURE: 66 MMHG | WEIGHT: 109 LBS | SYSTOLIC BLOOD PRESSURE: 108 MMHG | HEIGHT: 58 IN

## 2024-05-07 DIAGNOSIS — Z98.890 HISTORY OF LOOP ELECTRICAL EXCISION PROCEDURE (LEEP): Primary | ICD-10-CM

## 2024-05-07 PROCEDURE — 99213 OFFICE O/P EST LOW 20 MIN: CPT | Performed by: OBSTETRICS & GYNECOLOGY

## 2024-05-07 PROCEDURE — 88175 CYTOPATH C/V AUTO FLUID REDO: CPT | Performed by: OBSTETRICS & GYNECOLOGY

## 2024-05-07 NOTE — PROGRESS NOTES
"Assessment/Plan:  Pap smear done.  Provided stable return to office in 6 months for annual visit and repeat Pap.  No problem-specific Assessment & Plan notes found for this encounter.       Diagnoses and all orders for this visit:    History of loop electrical excision procedure (LEEP)  -     Liquid-based pap, diagnostic          Subjective:      Patient ID: Shavonne Arce is a 24 y.o. female.    HPI    This is a very pleasant 24-year-old female G0 who presents for repeat Pap smear.  She has had a history of cervical dysplasia over the last 2 years.  Colposcopy had revealed RONEN-2/3 and ultimately underwent LEEP procedure 3/2023 revealing RONEN-1.    LEEP 3/2023 RONEN 1    Pap 11/2023 LGSIL    Colpo 1/2023 CIN2/3  Pap 11/2022 LGSIL    The following portions of the patient's history were reviewed and updated as appropriate: allergies, current medications, past family history, past medical history, past social history, past surgical history, and problem list.    Review of Systems   Constitutional:  Negative for fatigue, fever and unexpected weight change.   Respiratory:  Negative for cough, chest tightness, shortness of breath and wheezing.    Cardiovascular: Negative.  Negative for chest pain and palpitations.   Gastrointestinal: Negative.  Negative for abdominal distention, abdominal pain, blood in stool, constipation, diarrhea, nausea and vomiting.   Genitourinary: Negative.  Negative for difficulty urinating, dyspareunia, dysuria, flank pain, frequency, genital sores, hematuria, pelvic pain, urgency, vaginal bleeding, vaginal discharge and vaginal pain.   Skin:  Negative for rash.         Objective:      /66   Ht 4' 10\" (1.473 m)   Wt 49.4 kg (109 lb)   LMP 04/07/2024 (Exact Date)   BMI 22.78 kg/m²          Physical Exam      The vagina is well estrogenized.  Cervix is small.  Slightly distorted due to prior surgery.  No lesions are seen.    "

## 2024-05-14 LAB
LAB AP GYN PRIMARY INTERPRETATION: NORMAL
Lab: NORMAL

## 2024-11-15 DIAGNOSIS — Z30.41 ENCOUNTER FOR SURVEILLANCE OF CONTRACEPTIVE PILLS: ICD-10-CM

## 2024-11-15 RX ORDER — NORGESTIMATE AND ETHINYL ESTRADIOL
1 KIT DAILY
Qty: 84 TABLET | Refills: 1 | Status: SHIPPED | OUTPATIENT
Start: 2024-11-15

## 2024-11-26 ENCOUNTER — ANNUAL EXAM (OUTPATIENT)
Dept: OBGYN CLINIC | Facility: CLINIC | Age: 24
End: 2024-11-26
Payer: COMMERCIAL

## 2024-11-26 VITALS
WEIGHT: 105.4 LBS | DIASTOLIC BLOOD PRESSURE: 68 MMHG | BODY MASS INDEX: 22.74 KG/M2 | SYSTOLIC BLOOD PRESSURE: 108 MMHG | HEIGHT: 57 IN

## 2024-11-26 DIAGNOSIS — Z01.419 ENCOUNTER FOR ANNUAL ROUTINE GYNECOLOGICAL EXAMINATION: Primary | ICD-10-CM

## 2024-11-26 DIAGNOSIS — Z30.41 ENCOUNTER FOR SURVEILLANCE OF CONTRACEPTIVE PILLS: ICD-10-CM

## 2024-11-26 DIAGNOSIS — Z98.890 HISTORY OF LOOP ELECTRICAL EXCISION PROCEDURE (LEEP): ICD-10-CM

## 2024-11-26 PROCEDURE — G0145 SCR C/V CYTO,THINLAYER,RESCR: HCPCS | Performed by: OBSTETRICS & GYNECOLOGY

## 2024-11-26 PROCEDURE — 99395 PREV VISIT EST AGE 18-39: CPT | Performed by: OBSTETRICS & GYNECOLOGY

## 2024-11-26 RX ORDER — NORGESTIMATE AND ETHINYL ESTRADIOL 7DAYSX3 LO
1 KIT ORAL DAILY
Qty: 84 TABLET | Refills: 3 | Status: SHIPPED | OUTPATIENT
Start: 2024-11-26

## 2024-11-26 NOTE — PROGRESS NOTES
Name: Shavonne Arce      : 2000      MRN: [id number]  Encounter Provider: Magdalena Cotton DO  Encounter Date: 2024   Encounter department: [facility]  :  Assessment & Plan  Encounter for annual routine gynecological examination    Orders:    Liquid-based pap, screening    Encounter for surveillance of contraceptive pills    Orders:    Norgestimate-Eth Estradiol (Tri-Lo-Sarah) 0.18/0.215/0.25 MG-25 MCG TABS; Take 1 tablet by mouth daily    History of loop electrical excision procedure (LEEP)         Pap done for cytology reflex HPV.  Encouraged self breast examination as well as calcium supplementation.  Continue Ortho Tri-Cyclen Lo.  She is inquiring about progesterone IUDs.  Reviewed the risks and benefits, side effects breakthrough bleeding, loss string, uterine perforation.  All questions answered.  She will notify me if she is interested, consider Nicole/Kendal.  We also discussed preprocedure Cytotec.  Otherwise return to office in 1 year or as needed      History of Present Illness     HPI  Shavonne Arce is a 24 y.o. female who presents     This is a pleasant 24-year-old female G0 who presents for her GYN exam.  She states her cycles are regular every 4 weeks lasting 4 days with no breakthrough bleeding.  She denies any changes in bowel or bladder function.  She is sexually active and has been in a monogamous relationship for 2 years.  She has been on the birth control pill for several years.  She is now inquiring about progesterone IUD.    GYN history significant for cervical dysplasia where colposcopy had revealed RONEN-2/3.  LEEP procedure thereafter was negative for high-grade lesion.    2024 Pap normal  2023 Pap low-grade CATHY  3/2023 LEEP procedure RONEN-1  2023 colposcopy RONEN-2/3    History obtained from: patient    Review of Systems   Constitutional:  Negative for fatigue, fever and unexpected weight change.   Respiratory:  Negative for cough, chest tightness,  "shortness of breath and wheezing.    Cardiovascular: Negative.  Negative for chest pain and palpitations.   Gastrointestinal: Negative.  Negative for abdominal distention, abdominal pain, blood in stool, constipation, diarrhea, nausea and vomiting.   Genitourinary: Negative.  Negative for difficulty urinating, dyspareunia, dysuria, flank pain, frequency, genital sores, hematuria, pelvic pain, urgency, vaginal bleeding, vaginal discharge and vaginal pain.   Skin:  Negative for rash.     Current Outpatient Medications on File Prior to Visit   Medication Sig Dispense Refill    COLLAGEN PO Take by mouth      multivitamin (THERAGRAN) TABS Take 1 tablet by mouth daily      [DISCONTINUED] norgestimate-ethinyl estradiol (Tri-Lo-Sarah) 0.18/0.215/0.25 MG-25 MCG per tablet TAKE 1 TABLET BY MOUTH EVERY DAY 84 tablet 1     No current facility-administered medications on file prior to visit.         Objective   /68   Ht 4' 9\" (1.448 m)   Wt 47.8 kg (105 lb 6.4 oz)   LMP 11/22/2024 (Exact Date)   BMI 22.81 kg/m²      Physical Exam  Constitutional:       Appearance: Normal appearance. She is well-developed.   HENT:      Head: Normocephalic and atraumatic.   Cardiovascular:      Rate and Rhythm: Normal rate and regular rhythm.   Pulmonary:      Effort: Pulmonary effort is normal.      Breath sounds: Normal breath sounds.   Chest:   Breasts:     Right: No inverted nipple, mass, nipple discharge, skin change or tenderness.      Left: No inverted nipple, mass, nipple discharge, skin change or tenderness.   Abdominal:      General: Bowel sounds are normal. There is no distension.      Palpations: Abdomen is soft.      Tenderness: There is no abdominal tenderness. There is no guarding or rebound.   Genitourinary:     Labia:         Right: No rash, tenderness or lesion.         Left: No rash, tenderness or lesion.       Vagina: Normal. No signs of injury. No vaginal discharge or tenderness.      Cervix: No cervical motion " tenderness, discharge, friability, lesion or cervical bleeding.      Uterus: Not enlarged and not tender.       Adnexa:         Right: No mass, tenderness or fullness.          Left: No mass, tenderness or fullness.     Neurological:      Mental Status: She is alert.   Psychiatric:         Behavior: Behavior normal.         Administrative Statements   I have spent a total time of 30 minutes in caring for this patient on the day of the visit/encounter including Prognosis, Risks and benefits of tx options, Instructions for management, Impressions, Counseling / Coordination of care, Documenting in the medical record, Reviewing / ordering tests, medicine, procedures  , and Obtaining or reviewing history  .

## 2024-12-02 LAB
LAB AP GYN PRIMARY INTERPRETATION: NORMAL
Lab: NORMAL

## (undated) DEVICE — SUT SILK 2-0 SH 30 IN K833H

## (undated) DEVICE — PENCIL ELECTROSURG E-Z CLEAN -0035H

## (undated) DEVICE — BETHLEHEM UNIVERSAL MINOR VAG: Brand: CARDINAL HEALTH

## (undated) DEVICE — 2000CC GUARDIAN II: Brand: GUARDIAN

## (undated) DEVICE — STANDARD SURGICAL GOWN, L: Brand: CONVERTORS

## (undated) DEVICE — STERILE 8 INCH PROCTO SWAB: Brand: CARDINAL HEALTH

## (undated) DEVICE — ELECTRODE BALL E-Z CLEAN 2IN -0015

## (undated) DEVICE — GLOVE PI ULTRA TOUCH SZ.7.0

## (undated) DEVICE — Device

## (undated) DEVICE — 3000CC GUARDIAN II: Brand: GUARDIAN